# Patient Record
(demographics unavailable — no encounter records)

---

## 2024-12-31 NOTE — ADDENDUM
[FreeTextEntry1] :  JOSH DIANA, am scribing for and in the presence of  in the following sections: HISTORY OF PRESENT ILLNESS, PAST MEDICAL/FAMILY/SOCIAL HISTORY, REVIEW OF SYSTEMS, VITAL SIGNS, PHYSICAL EXAM, ASSESSMENT/PLAN on 12/31/2024.

## 2024-12-31 NOTE — PHYSICAL EXAM
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

## 2024-12-31 NOTE — HISTORY OF PRESENT ILLNESS
[FreeTextEntry1] :  61 year old male patient seen for evaluation of elevated PSA. Pt complains of nocturia x1 and slight slowing of stream. Denies history of hematuria, dysuria, UTIs, or stones. Denies  urgency or urge incontinence. He also complains of mild erectile dysfunction. He was noted to have an elevation of PSA, as noted below, with most recent from December being 1.27. His previous PSAs have fluctuated and this is a moderate increase from previous values.  PMHx: arthritis, Islas's esophagus, asthma, skin cancer PSHx:  Right hip surgery, right hand surgery, excision of skin cancers ALG:  No known drug allergies SHx: Denies tobacco use; social alcohol use FHx: No known prostate cancer  PSA Levels:  12/9/24: 1.27 12/6/23: 0.33 12/12/22: 0.47 11/3/21: 0.32 10/29/20: 0.4  LABS FROM 12/9/24:  BUN 15 CRT 0.85 Urinalysis was negative

## 2024-12-31 NOTE — ASSESSMENT
[FreeTextEntry1] :  61 year old male patient with: 1) elevated PSA velocity - there is only one value and this is unclear if this is the beginning of an elevation or if this is just an isolated change. We discussed this in detail. We will repeat a PSA in 5-6 months. If there is further elevation, then I think we would proceed with MRI as long as we could get it through insurance. If PSA retreats, we would just follow him along. 2) For his mild LUTS with IPSS = 9, we will obtain a flowrate and PVR at his next visit. 3) Regarding ED, he is interested in trying phosphodiesterase inhibitors. However, he has some aortic related issues and I have asked him to get clearance from his cardiologist and he can get a trial of Cialis or Viagra, depending on his preference.  All of the patient's questions were otherwise answered. He understands and will follow-up as scheduled. Total time=45 min.   The submitted E/M billing level for this visit reflects the total time spent on the day of the visit including face-to-face time spent with the patient, non-face-to-face review of medical records and relevant information, documentation, and asynchronous communication with the patient after a visit via phone, email, or patients EHR portal after the visit. The medical records reviewed are either scanned into the chart or reviewed with the patient using a patients electronic medical records portal for patients with records not available to [facility] via electronic transmission platforms from other institutions and labs. Time spent counseling and performing coordination of care was also included in determining the appropriate EM billing level.   I have reviewed and verified information regarding the chief complaint and history recorded by the ancillary staff and/or the patient. I have independently reviewed and interpreted tests performed by other physicians and facilities as necessary.   I have discussed with the patient differential diagnosis, reason for auxiliary tests if ordered, risks, benefits, alternatives, and complications of each form of therapy were discussed.  I personally performed the services described in the documentation, reviewed the documentation recorded by the scribe in my presence, and it accurately and completely records my words and actions.

## 2025-03-18 NOTE — ASSESSMENT
[FreeTextEntry1] :  61 year old male patient with an episode of hematospermia. We discussed this is a normally benign condition. If he has further episodes, then we will consider further workup. Otherwise, he will follow-up as scheduled in June with PSA. All of the patient's questions were otherwise answered. Pt seen with LLUVIA Escobedo.   The submitted E/M billing level for this visit reflects the total time spent on the day of the visit including face-to-face time spent with the patient, non-face-to-face review of medical records and relevant information, documentation, and asynchronous communication with the patient after a visit via phone, email, or patients EHR portal after the visit. The medical records reviewed are either scanned into the chart or reviewed with the patient using a patients electronic medical records portal for patients with records not available to [facility] via electronic transmission platforms from other institutions and labs. Time spent counseling and performing coordination of care was also included in determining the appropriate EM billing level.   I have reviewed and verified information regarding the chief complaint and history recorded by the ancillary staff and/or the patient. I have independently reviewed and interpreted tests performed by other physicians and facilities as necessary.   I have discussed with the patient differential diagnosis, reason for auxiliary tests if ordered, risks, benefits, alternatives, and complications of each form of therapy were discussed.  I personally performed the services described in the documentation, reviewed the documentation recorded by the scribe in my presence, and it accurately and completely records my words and actions.

## 2025-03-18 NOTE — HISTORY OF PRESENT ILLNESS
[FreeTextEntry1] :  61 year old male patient with history of BPH with LUTS who comes in after having an episode of hematospermia. He denies any further episodes. He says this was an isolated incident. Denies any dysuria, hematuria, or infections. Urine testing was done was negative for any RBCs or infection.

## 2025-06-24 NOTE — ASSESSMENT
[FreeTextEntry1] : 61-year-old male with BPH, ED, hematospermia.  We discussed these issues in detail.  His PSA is back to his baseline.  He is not significantly bothered by his urinary symptoms.  We discussed he has a moderate residual today but patient feels that he overfilled himself.  He will follow-up in 4 to 6 months with a flow rate and PVR at that time.  I have asked patient to not drink as much fluids for that.  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  All of his questions were otherwise answered.  Patient discussed with LLUVIA Escobedo. No new problems. Agree with note above.

## 2025-06-24 NOTE — HISTORY OF PRESENT ILLNESS
[FreeTextEntry1] : 61-year-old male seen in follow-up for BPH, ED, hematospermia, and elevated PSA.  He is overall doing very well.  His voiding symptoms are stable. He has nocturia x 1.  He denies any urinary frequency or urgency.  He had 1 further episode of hematospermia but states this was a very small amount.  PSA June 2025 0.32.  Uroflow Results: Voiding time: 61.3 s Time to peak flow: 5.7 s Peak flow rate: 23.7 ml/s Average flow rate: 11.5 ml/s Voided volume: 697 ml PVR: 397 ml